# Patient Record
Sex: MALE | Race: WHITE | NOT HISPANIC OR LATINO | Employment: FULL TIME | URBAN - METROPOLITAN AREA
[De-identification: names, ages, dates, MRNs, and addresses within clinical notes are randomized per-mention and may not be internally consistent; named-entity substitution may affect disease eponyms.]

---

## 2017-01-20 ENCOUNTER — TRANSCRIBE ORDERS (OUTPATIENT)
Dept: PHYSICAL THERAPY | Facility: CLINIC | Age: 57
End: 2017-01-20

## 2017-01-20 DIAGNOSIS — S63.502D SPRAIN OF LEFT FOREARM, SUBSEQUENT ENCOUNTER: Primary | ICD-10-CM

## 2017-01-26 ENCOUNTER — TREATMENT (OUTPATIENT)
Dept: PHYSICAL THERAPY | Facility: CLINIC | Age: 57
End: 2017-01-26

## 2017-01-26 DIAGNOSIS — S63.502D SPRAIN OF WRIST, LEFT, SUBSEQUENT ENCOUNTER: Primary | ICD-10-CM

## 2017-01-26 PROCEDURE — 97110 THERAPEUTIC EXERCISES: CPT | Performed by: PHYSICAL THERAPIST

## 2017-01-26 PROCEDURE — 97002 PR PHYS THERAPY RE-EVALUATION: CPT | Performed by: PHYSICAL THERAPIST

## 2017-01-26 NOTE — PROGRESS NOTES
Physical Therapy Initial Evaluation and Plan of Care    TIME IN 8:05 TIME OUT 8:53    Subjective Evaluation    History of Present Illness  Date of onset: 10/6/2016  Mechanism of injury: Basket standing in fell while he was holding on with L hand    Pain  Current pain ratin  At best pain ratin  At worst pain ratin (initial injury)  Quality: sharp and dull ache  Relieving factors: rest  Exacerbated by: gripping.    Hand dominance: right    Diagnostic Tests  MRI studies: abnormal    Treatments  Previous treatment: physical therapy, injection treatment and medication  Current treatment comments: brace.     Patient Goals  Patient goals for therapy: return to work  Patient goal: STGs x x 1 wk  1. Decrease pain and popping with movement  2. Increase  strength by > 5 lbs for improved gripping ADLs  3. Tolerates lifting 50 lbs to waist    LTGs x 4 wks  1. Min to no pain and popping with functional ROM and strength  2.  L within 12 lbs of R  3. Independent with HEP and body mechanics  3. Tolerates lifting up to 75 lbs, climb vertical ladder to allow safe RTW           Objective     Tenderness     Additional Tenderness Details  No TTP    Active Range of Motion     Left Wrist   Wrist flexion: 65 degrees   Wrist extension: 63 degrees   Radial deviation: 30 degrees   Ulnar deviation: 35 degrees     Strength/Myotome Testing     Left Wrist/Hand   Wrist extension: 5  Wrist flexion: 4+  Radial deviation: 4+ (pain)  Ulnar deviation: 4+     (2nd hand position)     Trial 1: 75    Trial 2: 77    Trial 3: 66    Average: 72.67    Right Wrist/Hand      (2nd hand position)     Trial 1: 95    Trial 2: 97    Trial 3: 90    Average: 94    Tests     Additional Tests Details  NT - MRI did not indicate TFCC tear         Assessment & Plan     Assessment  Impairments: impaired physical strength and pain with function  Assessment details: Good improvement since injection with increased strength and decreased pain.  Still  with some weakness and mild pain with activity.  Has been off work for ~ 3 months and needs to focus on improving strength/stability to prepare for safe RTW.  Prognosis: good    Goals  STGs x x 1 wk  1. Decrease pain and popping with movement  2. Increase  strength by > 5 lbs for improved gripping ADLs  3. Tolerates lifting 50 lbs to waist    LTGs x 4 wks  1. Min to no pain and popping with functional ROM and strength  2.  L within 12 lbs of R  3. Independent with HEP and body mechanics  3. Tolerates lifting up to 75 lbs, climb vertical ladder to allow safe RTW    Plan  Therapy options: will be seen for skilled physical therapy services  Planned modality interventions: cryotherapy and thermotherapy (paraffin bath)  Planned therapy interventions: functional ROM exercises, strengthening, stretching, therapeutic activities and body mechanics training  Frequency: 3x week  Duration in weeks: 4  Treatment plan discussed with: patient        Manual Therapy:    0     mins  58769;  Therapeutic Exercise:    26     mins  43185;     Neuromuscular Nneka:    0    mins  09777;    Therapeutic Activity:     0     mins  38795;     Gait Trainin     mins  58111;     Ultrasound:     0     mins  42424;    Work Hardening           0      mins 79025  Iontophoresis               0   mins 79577    Timed Treatment:   26   mins   Total Treatment:     48   mins    PT SIGNATURE: Celsa Guadarrama PT   DATE TREATMENT INITIATED: 2017    Initial Certification  Certification Period: 2017  I certify that the therapy services are furnished while this patient is under my care.  The services outlined above are required by this patient, and will be reviewed every 90 days.     PHYSICIAN: Luis Fernando Mathew MD      DATE:     Please sign and return via fax to [phone].. Thank you, [facility].

## 2017-01-26 NOTE — LETTER
Physical Therapy Initial Evaluation and Plan of Care    TIME IN 8:05 TIME OUT 8:53    Subjective Evaluation    History of Present Illness  Date of onset: 10/6/2016  Mechanism of injury: Basket standing in fell while he was holding on with L hand    Pain  Current pain ratin  At best pain ratin  At worst pain ratin (initial injury)  Quality: sharp and dull ache  Relieving factors: rest  Exacerbated by: gripping.    Hand dominance: right    Diagnostic Tests  MRI studies: abnormal    Treatments  Previous treatment: physical therapy, injection treatment and medication  Current treatment comments: brace.     Patient Goals  Patient goals for therapy: return to work  Patient goal: STGs x x 1 wk  1. Decrease pain and popping with movement  2. Increase  strength by > 5 lbs for improved gripping ADLs  3. Tolerates lifting 50 lbs to waist    LTGs x 4 wks  1. Min to no pain and popping with functional ROM and strength  2.  L within 12 lbs of R  3. Independent with HEP and body mechanics  3. Tolerates lifting up to 75 lbs, climb vertical ladder to allow safe RTW           Objective     Tenderness     Additional Tenderness Details  No TTP    Active Range of Motion     Left Wrist   Wrist flexion: 65 degrees   Wrist extension: 63 degrees   Radial deviation: 30 degrees   Ulnar deviation: 35 degrees     Strength/Myotome Testing     Left Wrist/Hand   Wrist extension: 5  Wrist flexion: 4+  Radial deviation: 4+ (pain)  Ulnar deviation: 4+     (2nd hand position)     Trial 1: 75    Trial 2: 77    Trial 3: 66    Average: 72.67    Right Wrist/Hand      (2nd hand position)     Trial 1: 95    Trial 2: 97    Trial 3: 90    Average: 94    Tests     Additional Tests Details  NT - MRI did not indicate TFCC tear         Assessment & Plan     Assessment  Impairments: impaired physical strength and pain with function  Assessment details: Good improvement since injection with increased strength and decreased pain.  Still  with some weakness and mild pain with activity.  Has been off work for ~ 3 months and needs to focus on improving strength/stability to prepare for safe RTW.  Prognosis: good    Goals  STGs x x 1 wk  1. Decrease pain and popping with movement  2. Increase  strength by > 5 lbs for improved gripping ADLs  3. Tolerates lifting 50 lbs to waist    LTGs x 4 wks  1. Min to no pain and popping with functional ROM and strength  2.  L within 12 lbs of R  3. Independent with HEP and body mechanics  3. Tolerates lifting up to 75 lbs, climb vertical ladder to allow safe RTW    Plan  Therapy options: will be seen for skilled physical therapy services  Planned modality interventions: cryotherapy and thermotherapy (paraffin bath)  Planned therapy interventions: functional ROM exercises, strengthening, stretching, therapeutic activities and body mechanics training  Frequency: 3x week  Duration in weeks: 4  Treatment plan discussed with: patient        Manual Therapy:    0     mins  50054;  Therapeutic Exercise:    26     mins  57649;     Neuromuscular Nneka:    0    mins  05900;    Therapeutic Activity:     0     mins  46782;     Gait Trainin     mins  48263;     Ultrasound:     0     mins  43976;    Work Hardening           0      mins 88137  Iontophoresis               0   mins 89338    Timed Treatment:   26   mins   Total Treatment:     48   mins    PT SIGNATURE: Celsa Guadarrama PT   DATE TREATMENT INITIATED: 2017    Initial Certification  Certification Period: 2017  I certify that the therapy services are furnished while this patient is under my care.  The services outlined above are required by this patient, and will be reviewed every 90 days.     PHYSICIAN: Luis Fernando Mathew MD      DATE:     Please sign and return via fax to [phone].. Thank you, [facility].

## 2017-01-30 ENCOUNTER — TREATMENT (OUTPATIENT)
Dept: PHYSICAL THERAPY | Facility: CLINIC | Age: 57
End: 2017-01-30

## 2017-01-30 DIAGNOSIS — S63.502D SPRAIN OF WRIST, LEFT, SUBSEQUENT ENCOUNTER: Primary | ICD-10-CM

## 2017-01-30 PROCEDURE — 97110 THERAPEUTIC EXERCISES: CPT | Performed by: PHYSICAL THERAPIST

## 2017-01-30 PROCEDURE — 97530 THERAPEUTIC ACTIVITIES: CPT | Performed by: PHYSICAL THERAPIST

## 2017-01-30 NOTE — PROGRESS NOTES
Physical Therapy Daily Progress Note    Time In 8:29  Time Out 9:18    Joni Lee reports: doing fine    Subjective Evaluation    Pain  Current pain ratin           Objective   See Exercise, Manual, and Modality Logs for complete treatment.       Assessment & Plan     Assessment  Assessment details: Responding favorably to Rx with tolerance for increased ex and work sim activities with only mild c/o pain    Plan  Plan details: Progress ex and therapeutic activities as lauren                     Manual Therapy:    0     mins  63793;  Therapeutic Exercise:    35     mins  04117;     Neuromuscular Nneka:    8    mins  08985;    Therapeutic Activity:     0     mins  83842;     Gait Trainin     mins  02748;     Ultrasound:     0     mins  27988;    Work Hardening           0      mins 01088  Iontophoresis               0   mins 64365    Timed Treatment:   43   mins   Total Treatment:     49   mins    Celsa Guadarrama PT  Physical Therapist

## 2017-02-01 ENCOUNTER — TREATMENT (OUTPATIENT)
Dept: PHYSICAL THERAPY | Facility: CLINIC | Age: 57
End: 2017-02-01

## 2017-02-01 DIAGNOSIS — S63.502D SPRAIN OF WRIST, LEFT, SUBSEQUENT ENCOUNTER: Primary | ICD-10-CM

## 2017-02-01 PROCEDURE — 97530 THERAPEUTIC ACTIVITIES: CPT | Performed by: PHYSICAL THERAPIST

## 2017-02-01 PROCEDURE — 97110 THERAPEUTIC EXERCISES: CPT | Performed by: PHYSICAL THERAPIST

## 2017-02-01 NOTE — PROGRESS NOTES
Physical Therapy Daily Progress Note    Time In 8:34  Time Out 9:15    Joni Lee reports: did fine after the lifting last time    Subjective     Objective   See Exercise, Manual, and Modality Logs for complete treatment.       Assessment & Plan     Assessment  Assessment details: Progressing well without increased pain; able to increase resistance and/or reps     Plan  Plan details: Continue to focus on strength, stability and function                     Manual Therapy:    0     mins  78175;  Therapeutic Exercise:    28     mins  62025;     Neuromuscular Nneka:    0    mins  96922;    Therapeutic Activity:     11     mins  05143;     Gait Trainin     mins  08263;     Ultrasound:     0     mins  17741;    Work Hardening           0      mins 65690  Iontophoresis               0   mins 06717    Timed Treatment:   39   mins   Total Treatment:     41   mins    Celsa Guadarrama PT  Physical Therapist

## 2017-02-03 ENCOUNTER — TREATMENT (OUTPATIENT)
Dept: PHYSICAL THERAPY | Facility: CLINIC | Age: 57
End: 2017-02-03

## 2017-02-03 DIAGNOSIS — S63.502D SPRAIN OF WRIST, LEFT, SUBSEQUENT ENCOUNTER: Primary | ICD-10-CM

## 2017-02-03 PROCEDURE — 97110 THERAPEUTIC EXERCISES: CPT | Performed by: PHYSICAL THERAPIST

## 2017-02-03 PROCEDURE — 97530 THERAPEUTIC ACTIVITIES: CPT | Performed by: PHYSICAL THERAPIST

## 2017-02-03 NOTE — PROGRESS NOTES
Physical Therapy  Progress Note    Time In 7:01  Time Out 7:48      2/3/2017  Luis Fernando Mathew MD    Re: Joni Lee  ________________________________________________________________    Mr. Joni Lee, has attended 4/4 PT sessions since your referral.  Treatment has consisted of: progressive there-ex and work simulation activities.     S: Mr. Joni Lee states: only slight pain with use of L wrist          Subjective Evaluation    Pain  Current pain ratin           Objective     Tenderness     Left Wrist/Hand   Tenderness in the triangular fibrocartilage complex.     Additional Tenderness Details  Ulnar-triquetral    Active Range of Motion     Left Wrist   Wrist flexion: 65 degrees   Wrist extension: 67 degrees   Radial deviation: 23 degrees   Ulnar deviation: 30 degrees     Strength/Myotome Testing     Left Wrist/Hand   Normal wrist strength     (2nd hand position)     Trial 1: 86    Trial 2: 79    Trial 3: 78    Average: 81    Right Wrist/Hand      (2nd hand position)     Trial 1: 105    Trial 2: 102    Trial 3: 101    Average: 102.67    Tests     Additional Tests Details  Increased joint play L vs R wrist    Functional Assessment     Comments  Tolerates lifting up to 70 lbs knees to waist without c/o pain.  Tolerates vertical ladder climbing without c/o pain.     See Exercise, Manual, and Modality Logs for complete treatment.       Assessment & Plan     Assessment  Assessment details: Excellent overall progress with improved mobility and strength and tolerance for functional activities.  May be ready for D/C to HEP and RTW.    Plan  Plan details: Please advise after your exam.                   Manual Therapy:    0     mins  55572;  Therapeutic Exercise:    28     mins  30840;     Neuromuscular Nneka:    0    mins  46346;    Therapeutic Activity:     14     mins  48524;     Gait Trainin     mins  82190;     Ultrasound:     0     mins  94112;    Work Hardening           0       mins 07272  Iontophoresis               0   mins 68158    Timed Treatment:   42   mins   Total Treatment:     47   mins    Celsa Guadarrama PT  Physical Therapist

## 2025-02-25 RX ORDER — SILDENAFIL CITRATE 20 MG/1
20-100 TABLET ORAL AS NEEDED
COMMUNITY
Start: 2024-05-05 | End: 2025-05-05

## 2025-02-25 RX ORDER — FAMOTIDINE 20 MG/1
20 TABLET, FILM COATED ORAL NIGHTLY
COMMUNITY

## 2025-02-25 RX ORDER — ACETAMINOPHEN 500 MG
1000 TABLET ORAL EVERY 6 HOURS PRN
COMMUNITY

## 2025-02-25 RX ORDER — IBUPROFEN 200 MG
400 TABLET ORAL EVERY 6 HOURS PRN
COMMUNITY

## 2025-02-25 RX ORDER — LISINOPRIL 10 MG/1
10 TABLET ORAL DAILY
COMMUNITY

## 2025-02-25 RX ORDER — DICLOFENAC SODIUM 75 MG/1
1 TABLET, DELAYED RELEASE ORAL 2 TIMES DAILY
COMMUNITY
Start: 2025-01-28

## 2025-02-25 RX ORDER — SIMVASTATIN 40 MG
40 TABLET ORAL DAILY
COMMUNITY

## 2025-02-25 RX ORDER — VALACYCLOVIR HYDROCHLORIDE 500 MG/1
500 TABLET, FILM COATED ORAL AS NEEDED
COMMUNITY

## 2025-02-25 NOTE — DISCHARGE INSTRUCTIONS
[facility]     Home Care after: Retrograde Urethrogram / Cystoscopy (Studying the urethra for narrowing)    Follow the guidelines below. Call your doctor if you notice any unusual symptoms. Remember: You may be under the influence of medication. Do not drive, drink alcoholic beverages, sign legal documents or make major decisions during the next 24 hours.    Wound Care  You have No incisions  You may have some pink tinge to the urine and this may fluctuate and then go away.    Activity  Plan at least the next day off work, more if necessary, especially if your job requires heavy lifting or a lot of physical activities.  If you wish to return to work sooner, that is okay, but light-duty is recommended.  Sexual activity may resume  No activity restrictions  No driving while taking narcotic pain medication  Avoid straining with bowel movements. Narcotics can cause constipation so you can take over-the-counter stool softeners (Senokot-S, Miralax, Colace) per the instructions on the box; hold these pills if you are experiencing diarrhea.       Return to Work/School  You may return to work/school tomorrow  If you need a note, please obtain from Dr. Meraz's office during your follow-up visit    Bathing  No restrictions    Diet  Gradually resume regular diet, as tolerated.   Drinking plenty of water is very important.    Driving (if applicable)  No driving for 24 hours after surgery due to the anesthetic.  No driving anytime you are on pain medication.    Educational  The medication used to put you to sleep will be acting in your body for the next 24 hours, so you might feel a little sleepy. This feeling will slowly wear off. For the first 24 hours, you should NOT:   Drive a car, operate machinery, or power tools.  Drink any alcoholic drinks (even beer).   Make any important decisions, sign any important or legal papers.  For your safety, we strongly suggest that a responsible adult stay with you for the rest of the day  and during the night after you leave the hospital.  Medication  You may take your usual medications unless told otherwise by your doctor.  You can any anticoagulation (Ibuprofen, Advil, Aspirin, Eliquis, Xarelto, Coumadin, etc) you are currently prescribed.  Narcotic pain medications can cause constipation. You may take an over-the-counter stool softener or laxative if needed.   A bowel movement every day is preferred, every other day is reasonable.      Call your Doctor if:  Call to notify your surgeon if you develop:  Fever greater than 101F  Unmanageable pain despite pain medication  Pain medication not effective or makes you ill  Blood staining continues to worsen for more than 24 hours  Difficulty breathing or unusual shortness of breath, excessive bleeding, drainage at the operative site, fevers, chills, increased pain that is not relieved by pain medications, persistent nausea or vomiting    HOW TO REACH YOUR DOCTOR  Monday - Friday from 8:00 - 4:30, call the Urology Office, [phone].  After hours, weekend and holidays call the [phone] or go to Emergency Room    Follow up care:   The Urology clinic will call to schedule your post-op appointment and/or surgery scheduling  If you do not receive a call within 1 week for scheduling, please call [phone] to make an appointment.

## 2025-02-25 NOTE — PAT
PAT call complete. Education provided to the patient on the following:    - Nothing to eat after midnight the night before your procedure, water and black coffee okay up to 2 hours before arrival time.  - You will need to have someone drive you home after your procedure and remain with you for 24 hours after. The  will need to remain on site during your visit.  - Please remove all jewelry, including body piercing's, and leave any valuables at home. Only bring your drivers license and insurance card on day of procedure.  - Wash with antibacterial soap (such as Dial) the night before and morning of procedure.  - Be prepared to provide your last dose of all home medications.  - Coffee and vending available on the 1st and 5th floors; no cafeteria on site.  - You will need to arrive at 0600 on 3/4/25 at [facility] located at [address]. You'll get registered on the first floor then bring your papers up to the 5th floor and a  nurse will come out to get you.  -Please be aware that arrival times may be subject to change up until the day of surgery. You'll get a reminder call the day prior to your procedure.   - Feel free to contact us at: [phone] with any additional questions/concerns.    **Instructed pt to stop diclofenac and ibuprofen stating today and to take famotidine the morning of surgery with a sip of water. Pt verbalized understanding.

## 2025-03-04 ENCOUNTER — ANESTHESIA (OUTPATIENT)
Age: 65
End: 2025-03-04
Payer: COMMERCIAL

## 2025-03-04 ENCOUNTER — APPOINTMENT (OUTPATIENT)
Age: 65
End: 2025-03-04
Payer: COMMERCIAL

## 2025-03-04 ENCOUNTER — ANESTHESIA EVENT (OUTPATIENT)
Age: 65
End: 2025-03-04
Payer: COMMERCIAL

## 2025-03-04 ENCOUNTER — HOSPITAL ENCOUNTER (OUTPATIENT)
Age: 65
Setting detail: HOSPITAL OUTPATIENT SURGERY
Discharge: HOME | End: 2025-03-04
Attending: UROLOGY | Admitting: UROLOGY
Payer: COMMERCIAL

## 2025-03-04 VITALS
RESPIRATION RATE: 18 BRPM | HEART RATE: 63 BPM | SYSTOLIC BLOOD PRESSURE: 141 MMHG | HEIGHT: 71 IN | WEIGHT: 229.8 LBS | TEMPERATURE: 97.7 F | OXYGEN SATURATION: 95 % | DIASTOLIC BLOOD PRESSURE: 90 MMHG | BODY MASS INDEX: 32.17 KG/M2

## 2025-03-04 PROCEDURE — 25010000002 FENTANYL CITRATE (PF) 50 MCG/ML SOLUTION: Performed by: NURSE ANESTHETIST, CERTIFIED REGISTERED

## 2025-03-04 PROCEDURE — 25010000002 LIDOCAINE 2% SOLUTION: Performed by: NURSE ANESTHETIST, CERTIFIED REGISTERED

## 2025-03-04 PROCEDURE — 25810000003 SODIUM CHLORIDE PER 500 ML: Performed by: UROLOGY

## 2025-03-04 PROCEDURE — 25810000003 LACTATED RINGERS PER 1000 ML: Performed by: ANESTHESIOLOGY

## 2025-03-04 PROCEDURE — 25010000002 PROPOFOL 10 MG/ML EMULSION: Performed by: NURSE ANESTHETIST, CERTIFIED REGISTERED

## 2025-03-04 PROCEDURE — 25010000002 CEFAZOLIN PER 500 MG: Performed by: UROLOGY

## 2025-03-04 PROCEDURE — 25010000002 ONDANSETRON PER 1 MG: Performed by: NURSE ANESTHETIST, CERTIFIED REGISTERED

## 2025-03-04 PROCEDURE — 25510000002 IOTHALAMATE 60 % SOLUTION: Performed by: UROLOGY

## 2025-03-04 PROCEDURE — 25010000002 DEXAMETHASONE SODIUM PHOSPHATE 20 MG/5ML SOLUTION: Performed by: NURSE ANESTHETIST, CERTIFIED REGISTERED

## 2025-03-04 PROCEDURE — 51610 INJECTION FOR BLADDER X-RAY: CPT | Performed by: UROLOGY

## 2025-03-04 RX ORDER — ONDANSETRON 2 MG/ML
INJECTION INTRAMUSCULAR; INTRAVENOUS AS NEEDED
Status: DISCONTINUED | OUTPATIENT
Start: 2025-03-04 | End: 2025-03-04 | Stop reason: SURG

## 2025-03-04 RX ORDER — LABETALOL HYDROCHLORIDE 5 MG/ML
5 INJECTION, SOLUTION INTRAVENOUS
Status: DISCONTINUED | OUTPATIENT
Start: 2025-03-04 | End: 2025-03-04 | Stop reason: HOSPADM

## 2025-03-04 RX ORDER — MIDAZOLAM HYDROCHLORIDE 1 MG/ML
1 INJECTION, SOLUTION INTRAMUSCULAR; INTRAVENOUS
Status: DISCONTINUED | OUTPATIENT
Start: 2025-03-04 | End: 2025-03-04 | Stop reason: HOSPADM

## 2025-03-04 RX ORDER — FAMOTIDINE 10 MG/ML
20 INJECTION, SOLUTION INTRAVENOUS ONCE
Status: COMPLETED | OUTPATIENT
Start: 2025-03-04 | End: 2025-03-04

## 2025-03-04 RX ORDER — PROMETHAZINE HYDROCHLORIDE 12.5 MG/1
25 TABLET ORAL ONCE AS NEEDED
Status: DISCONTINUED | OUTPATIENT
Start: 2025-03-04 | End: 2025-03-04 | Stop reason: HOSPADM

## 2025-03-04 RX ORDER — DEXAMETHASONE SODIUM PHOSPHATE 4 MG/ML
INJECTION, SOLUTION INTRA-ARTICULAR; INTRALESIONAL; INTRAMUSCULAR; INTRAVENOUS; SOFT TISSUE AS NEEDED
Status: DISCONTINUED | OUTPATIENT
Start: 2025-03-04 | End: 2025-03-04 | Stop reason: SURG

## 2025-03-04 RX ORDER — SODIUM CHLORIDE 0.9 % (FLUSH) 0.9 %
3-10 SYRINGE (ML) INJECTION AS NEEDED
Status: DISCONTINUED | OUTPATIENT
Start: 2025-03-04 | End: 2025-03-04 | Stop reason: HOSPADM

## 2025-03-04 RX ORDER — SODIUM CHLORIDE 0.9 % (FLUSH) 0.9 %
3 SYRINGE (ML) INJECTION EVERY 12 HOURS SCHEDULED
Status: DISCONTINUED | OUTPATIENT
Start: 2025-03-04 | End: 2025-03-04 | Stop reason: HOSPADM

## 2025-03-04 RX ORDER — DROPERIDOL 2.5 MG/ML
0.62 INJECTION, SOLUTION INTRAMUSCULAR; INTRAVENOUS
Status: DISCONTINUED | OUTPATIENT
Start: 2025-03-04 | End: 2025-03-04 | Stop reason: HOSPADM

## 2025-03-04 RX ORDER — FENTANYL CITRATE 50 UG/ML
50 INJECTION, SOLUTION INTRAMUSCULAR; INTRAVENOUS ONCE AS NEEDED
Status: DISCONTINUED | OUTPATIENT
Start: 2025-03-04 | End: 2025-03-04 | Stop reason: HOSPADM

## 2025-03-04 RX ORDER — OXYCODONE AND ACETAMINOPHEN 7.5; 325 MG/1; MG/1
1 TABLET ORAL EVERY 4 HOURS PRN
Status: DISCONTINUED | OUTPATIENT
Start: 2025-03-04 | End: 2025-03-04 | Stop reason: HOSPADM

## 2025-03-04 RX ORDER — HYDRALAZINE HYDROCHLORIDE 20 MG/ML
5 INJECTION INTRAMUSCULAR; INTRAVENOUS
Status: DISCONTINUED | OUTPATIENT
Start: 2025-03-04 | End: 2025-03-04 | Stop reason: HOSPADM

## 2025-03-04 RX ORDER — ONDANSETRON 2 MG/ML
4 INJECTION INTRAMUSCULAR; INTRAVENOUS ONCE AS NEEDED
Status: DISCONTINUED | OUTPATIENT
Start: 2025-03-04 | End: 2025-03-04 | Stop reason: HOSPADM

## 2025-03-04 RX ORDER — LIDOCAINE HYDROCHLORIDE 20 MG/ML
INJECTION, SOLUTION INFILTRATION; PERINEURAL AS NEEDED
Status: DISCONTINUED | OUTPATIENT
Start: 2025-03-04 | End: 2025-03-04 | Stop reason: SURG

## 2025-03-04 RX ORDER — FLUMAZENIL 0.1 MG/ML
0.2 INJECTION INTRAVENOUS AS NEEDED
Status: DISCONTINUED | OUTPATIENT
Start: 2025-03-04 | End: 2025-03-04 | Stop reason: HOSPADM

## 2025-03-04 RX ORDER — ATROPINE SULFATE 0.4 MG/ML
0.4 INJECTION, SOLUTION INTRAMUSCULAR; INTRAVENOUS; SUBCUTANEOUS ONCE AS NEEDED
Status: DISCONTINUED | OUTPATIENT
Start: 2025-03-04 | End: 2025-03-04 | Stop reason: HOSPADM

## 2025-03-04 RX ORDER — FENTANYL CITRATE 50 UG/ML
50 INJECTION, SOLUTION INTRAMUSCULAR; INTRAVENOUS
Status: DISCONTINUED | OUTPATIENT
Start: 2025-03-04 | End: 2025-03-04 | Stop reason: HOSPADM

## 2025-03-04 RX ORDER — HYDROMORPHONE HYDROCHLORIDE 1 MG/ML
0.5 INJECTION, SOLUTION INTRAMUSCULAR; INTRAVENOUS; SUBCUTANEOUS
Status: DISCONTINUED | OUTPATIENT
Start: 2025-03-04 | End: 2025-03-04 | Stop reason: HOSPADM

## 2025-03-04 RX ORDER — NALOXONE HCL 0.4 MG/ML
0.2 VIAL (ML) INJECTION AS NEEDED
Status: DISCONTINUED | OUTPATIENT
Start: 2025-03-04 | End: 2025-03-04 | Stop reason: HOSPADM

## 2025-03-04 RX ORDER — SODIUM CHLORIDE 9 MG/ML
INJECTION, SOLUTION INTRAVENOUS AS NEEDED
Status: DISCONTINUED | OUTPATIENT
Start: 2025-03-04 | End: 2025-03-04 | Stop reason: HOSPADM

## 2025-03-04 RX ORDER — PROMETHAZINE HYDROCHLORIDE 25 MG/1
25 SUPPOSITORY RECTAL ONCE AS NEEDED
Status: DISCONTINUED | OUTPATIENT
Start: 2025-03-04 | End: 2025-03-04 | Stop reason: HOSPADM

## 2025-03-04 RX ORDER — DIPHENHYDRAMINE HYDROCHLORIDE 50 MG/ML
12.5 INJECTION INTRAMUSCULAR; INTRAVENOUS
Status: DISCONTINUED | OUTPATIENT
Start: 2025-03-04 | End: 2025-03-04 | Stop reason: HOSPADM

## 2025-03-04 RX ORDER — FENTANYL CITRATE 50 UG/ML
INJECTION, SOLUTION INTRAMUSCULAR; INTRAVENOUS AS NEEDED
Status: DISCONTINUED | OUTPATIENT
Start: 2025-03-04 | End: 2025-03-04 | Stop reason: SURG

## 2025-03-04 RX ORDER — HYDROCODONE BITARTRATE AND ACETAMINOPHEN 5; 325 MG/1; MG/1
1 TABLET ORAL ONCE AS NEEDED
Status: DISCONTINUED | OUTPATIENT
Start: 2025-03-04 | End: 2025-03-04 | Stop reason: HOSPADM

## 2025-03-04 RX ORDER — SODIUM CHLORIDE, SODIUM LACTATE, POTASSIUM CHLORIDE, CALCIUM CHLORIDE 600; 310; 30; 20 MG/100ML; MG/100ML; MG/100ML; MG/100ML
9 INJECTION, SOLUTION INTRAVENOUS CONTINUOUS
Status: DISCONTINUED | OUTPATIENT
Start: 2025-03-04 | End: 2025-03-04 | Stop reason: HOSPADM

## 2025-03-04 RX ORDER — PROPOFOL 10 MG/ML
VIAL (ML) INTRAVENOUS AS NEEDED
Status: DISCONTINUED | OUTPATIENT
Start: 2025-03-04 | End: 2025-03-04 | Stop reason: SURG

## 2025-03-04 RX ADMIN — SODIUM CHLORIDE, POTASSIUM CHLORIDE, SODIUM LACTATE AND CALCIUM CHLORIDE 9 ML/HR: 600; 310; 30; 20 INJECTION, SOLUTION INTRAVENOUS at 06:38

## 2025-03-04 RX ADMIN — DEXAMETHASONE SODIUM PHOSPHATE 8 MG: 4 INJECTION, SOLUTION INTRAMUSCULAR; INTRAVENOUS at 07:31

## 2025-03-04 RX ADMIN — ONDANSETRON 4 MG: 2 INJECTION, SOLUTION INTRAMUSCULAR; INTRAVENOUS at 07:37

## 2025-03-04 RX ADMIN — SODIUM CHLORIDE 2000 MG: 900 INJECTION INTRAVENOUS at 07:08

## 2025-03-04 RX ADMIN — FAMOTIDINE 20 MG: 10 INJECTION, SOLUTION INTRAVENOUS at 06:39

## 2025-03-04 RX ADMIN — LIDOCAINE HYDROCHLORIDE 80 MG: 20 INJECTION, SOLUTION INFILTRATION; PERINEURAL at 07:18

## 2025-03-04 RX ADMIN — PROPOFOL 200 MG: 10 INJECTION, EMULSION INTRAVENOUS at 07:18

## 2025-03-04 RX ADMIN — FENTANYL CITRATE 50 MCG: 50 INJECTION, SOLUTION INTRAMUSCULAR; INTRAVENOUS at 07:18

## 2025-03-04 NOTE — ANESTHESIA PROCEDURE NOTES
Airway  Urgency: elective    Date/Time: 3/4/2025 7:20 AM  Airway not difficult    General Information and Staff    Patient location during procedure: OR  CRNA/CAA: Alejandra Kulkarni CRNA    Indications and Patient Condition  Indications for airway management: airway protection    Preoxygenated: yes  MILS not maintained throughout  Mask difficulty assessment: 1 - vent by mask    Final Airway Details  Final airway type: supraglottic airway      Successful airway: classic and LMA  Size 5     Number of attempts at approach: 1  Assessment: lips, teeth, and gum same as pre-op    Additional Comments  Inflated to seal.

## 2025-03-04 NOTE — OP NOTE
"Operative Note  3/4/2025    Joni Lee  64 y.o.  1960  male  [id number]    Surgeon(s) and Role:  Reyes Meraz MD - Primary     Pre-operative Diagnosis: Bulbar Urethral stricture    Post-operative Diagnosis: Same    Complications: None    Procedures:  Injection for Retrograde Urethrocystography (85184)  Urethrocystography, Retrograde (95948-67)  Cystoscopy, flexible (38074)    Indications: Formal evaluation of urethral stricture. History of prior urethroplasty. No with recurrent urethral stricture. Very nice man with a tough problem and hopefully we can fix it with future redo urethroplasty.    We discussed the benefits/risks/expectations and the patient desires procedure. Risks include bleeding, infection, urinary tract infection/sepsis, damage to adjacent tissues including the genitalia, discomfort/pain, urinary tract infection, and radiation exposure, stroke, heart attack, and death    Consent: Prior to the procedure, a \"time-out\" was performed. The patient was correctly identified.  he risks and benefits of the procedure were discussed, including discomfort/pain, urinary tract infection, and radiation exposure. The patient verbalized understanding and agreed to proceed and signed consent was obtained.      Description of Procedure: General Anesthesia was induced. The patient was positioned on the fluoroscopy table in the oblique position. The penis was prepped with betadine solution. A sterile 4x4 gauze was wrapped around the glans and pressure was applied while cystografin 30% contrast was instilled retrograde through the the urethra using a 60mL syringe with adaptor. Fluoroscopy was used and interpreted.      Retrograde Urethrogram findings:   - Widely patent penile urethra  -  Proximal bulbar urethral stricture, approx 2.5-3cm   - Membranous Urethra - unable to visualize  - Prostatic Urethra - unable to visualize     Next, the lubricated Olympus flexible cystoscope was introduced gently " into the urethra. The penile urethra appears normal. The scope was advanced to the distal aspect of the bulbar urethral stricture.  The urothelium at the stricture was pale. The scope was not advanced through the stricture.     There was no bleeding noted during this procedure. The procedure was tolerated well.      After the procedure the patient was moved off the imaging table and asked to dress himself again.     Images Saved.      Estimated Blood Loss: None    Assessment:     Proximal bulbar urethral stricture, recurrent    We discussed all options and contingency plans in detail.  He is already scheduled for urethroplasty, [place] of Buccal Mucosa graft in April 2025.         Reyes Meraz MD  [facility]  General & Reconstructive Urology  Office: [phone]  Fax: [phone]

## 2025-03-04 NOTE — H&P
[facility] History & Physical      Patient Identification:  NAME:  Joni Lee  Age:  64 y.o.   Sex:  male   :  1960   MRN:  [id number]       Chief complaint: Planned Procedure    History of present illness:  Joni Lee is a 64 y.o. man here for evaluation of urethral patency. No changes since last seen in clinic. History of Urethroplasty, now with recurrent bulbar urethral stricture.      Past medical history:  Past Medical History:   Diagnosis Date    Arthritis     BILAT. KNEE PAIN    Benign prostatic hyperplasia with nocturia     Bulbous urethral stricture 2023    Erectile dysfunction 2016    Essential hypertension 2016    GERD (gastroesophageal reflux disease)     Glucose intolerance     Hyperlipidemia     Pure hypercholesterolemia 2016    Septic arthritis 2021    STD (male) 2010    PRIMARY SYPHILLIS    Umbilical hernia 2016    no surgery       Past surgical history:  Past Surgical History:   Procedure Laterality Date    COLONOSCOPY      CYSTOSCOPY URETHROGRAM  2023    INGUINAL HERNIA REPAIR Right 2007    KNEE ARTHROSCOPY Left     URETHROPLASTY  2023       Allergies:  Penicillins    Home medications:  Medications Prior to Admission   Medication Sig Dispense Refill Last Dose/Taking    acetaminophen (TYLENOL) 500 MG tablet Take 2 tablets by mouth Every 6 (Six) Hours As Needed for Mild Pain.   3/3/2025    diclofenac (VOLTAREN) 75 MG EC tablet Take 1 tablet by mouth 2 (Two) Times a Day.   2025    famotidine (PEPCID) 20 MG tablet Take 1 tablet by mouth Every Night.   3/3/2025    lisinopril (PRINIVIL,ZESTRIL) 10 MG tablet Take 1 tablet by mouth Daily.   3/3/2025    sildenafil (REVATIO) 20 MG tablet Take 1-5 tablets by mouth As Needed.   2025    simvastatin (ZOCOR) 40 MG tablet Take 1 tablet by mouth Daily.   3/3/2025    ibuprofen (ADVIL,MOTRIN) 200 MG tablet Take 2 tablets by mouth Every 6 (Six) Hours As Needed for Mild Pain.   2025    valACYclovir  (VALTREX) 500 MG tablet Take 1 tablet by mouth As Needed.   More than a month        Hospital medications:  ceFAZolin, 2,000 mg, Intravenous, Once  sodium chloride, 3 mL, Intravenous, Q12H      lactated ringers, 9 mL/hr, Last Rate: 9 mL/hr (25 0649)        atropine    diphenhydrAMINE    droperidol **OR** droperidol    fentanyl    fentanyl    flumazenil    hydrALAZINE    HYDROcodone-acetaminophen    HYDROmorphone    labetalol    midazolam    naloxone    ondansetron    oxyCODONE-acetaminophen    promethazine **OR** promethazine    sodium chloride    Family history:  Family History   Problem Relation Age of Onset    Malig Hyperthermia Neg Hx        Social history:  Social History     Tobacco Use    Smoking status: Never    Smokeless tobacco: Never   Vaping Use    Vaping status: Never Used   Substance Use Topics    Alcohol use: Yes     Comment: social    Drug use: Not Currently     Types: Marijuana     Comment: teens-20s       REVIEW OF SYSTEMS:  Constitutional - Negative for fevers/chills  Eyes/Ears/Nose/Mouth/Throat - Negative for changes in vision  Cardiovascular - Negative for chest pain, dysrhythmia  Respiratory - Negative for dyspnea  Gastrointestinal - Negative for nausea or vomiting  Genitourinary - Negative for dysuria  Hematologic/Lymphatic - Negative for bruising  Skin - Negative for erythema  Endocrine - See PMH    Objective:  TMax 24 hours:   Temp (24hrs), Av.8 °F (36.6 °C), Min:97.8 °F (36.6 °C), Max:97.8 °F (36.6 °C)      Vitals Ranges:   Temp:  [97.8 °F (36.6 °C)] 97.8 °F (36.6 °C)  Heart Rate:  [79] 79  Resp:  [16] 16  BP: (147)/(66) 147/66    Intake/Output Last 3 shifts:  No intake/output data recorded.     Physical Exam:    General Appearance:    Alert, cooperative, NAD   HEENT:    No trauma, pupils reactive, hearing intact   Back:     No CVA tenderness   Lungs:     Respirations unlabored, no wheezing    Heart:    RRR, intact peripheral pulses   Abdomen:     Soft, NDNT, no masses, no  guarding   :    Testes descended bilaterally, no nodules.  Penis normal.      No scrotal or penile rashes noted   Extremities:   No edema, no deformity   Lymphatic:   No neck or groin LAD   Skin:   No bleeding, bruising or rashes   Neuro/Psych:   Orientation intact, mood/affect pleasant, no focal findings       Results review:   I reviewed the patient's new clinical results.    Data review:  Lab Results (last 24 hours)       ** No results found for the last 24 hours. **             Imaging:  Imaging Results (Last 24 Hours)       ** No results found for the last 24 hours. **               Assessment:       * No active hospital problems. *      Urethral stricture    We discussed the benefits/risks/expectations and the patient desires surgical intervention. Risks include bleeding, infection, urinary tract infection/sepsis, Urinary retention, damage to adjacent tissues including the genitalia, chronic pain, numbness, incontinence, need for additional procedures, stroke, heart attack, and death.      Plan:     Proceed with Retrograde Urethrogram, Cystoscopy        Reyes Meraz MD  [facility]  General & Reconstructive Urology  Office: [phone]  Fax: [phone]

## 2025-03-04 NOTE — ANESTHESIA POSTPROCEDURE EVALUATION
"Patient: Joni Lee    Procedure Summary       Date: 03/04/25 Room / Location: [facility] OR 01 / [place]  MAIN OR    Anesthesia Start: 0714 Anesthesia Stop: 0745    Procedure: CYSTOSCOPY RETROGRADE URETHROGRAM (Urethra) Diagnosis:     Surgeons: Reyes Meraz MD Provider: Jesus Velez MD    Anesthesia Type: general ASA Status: 3            Anesthesia Type: general    Vitals  Vitals Value Taken Time   /80 03/04/25 0815   Temp 36.7 °C (98.1 °F) 03/04/25 0745   Pulse 72 03/04/25 0817   Resp 16 03/04/25 0815   SpO2 96 % 03/04/25 0817   Vitals shown include unfiled device data.        Post Anesthesia Care and Evaluation    Patient location during evaluation: bedside  Patient participation: complete - patient participated  Level of consciousness: awake and alert  Pain management: adequate    Airway patency: patent  Anesthetic complications: No anesthetic complications    Cardiovascular status: acceptable  Respiratory status: acceptable  Hydration status: acceptable    Comments: /90 (BP Location: Left arm, Patient Position: Sitting)   Pulse 63   Temp 36.5 °C (97.7 °F) (Temporal)   Resp 18   Ht 180.3 cm (71\")   Wt 104 kg (229 lb 12.8 oz)   SpO2 95%   BMI 32.05 kg/m²     "

## 2025-03-04 NOTE — ANESTHESIA PREPROCEDURE EVALUATION
Anesthesia Evaluation     NPO Solid Status: > 8 hours             Airway   Mallampati: III  TM distance: >3 FB  Neck ROM: full  No difficulty expected and Possible difficult intubation  Dental - normal exam         Pulmonary    (-) wheezes  Cardiovascular     Rhythm: regular    (+) hypertension, hyperlipidemia      Neuro/Psych  GI/Hepatic/Renal/Endo    (+) GERD    ROS Comment: GFR 98    Musculoskeletal     Abdominal    Substance History      OB/GYN          Other                      Anesthesia Plan    ASA 3     general     (  D/W R&B of GA including but not limited to: heart, lung, liver, kidney, neurologic problems, positioning injuries, dental damage, corneal abrasion and TMJ.  .)  intravenous induction     Anesthetic plan, risks, benefits, and alternatives have been provided, discussed and informed consent has been obtained with: patient.    CODE STATUS:

## (undated) DEVICE — ADAPTER,CATHETER/SYRINGE/LUER,STERILE: Brand: MEDLINE

## (undated) DEVICE — GLV SURG BIOGEL LTX PF 7

## (undated) DEVICE — GLV SURG SENSICARE POLYISPRN W/ALOE PF LF 6.5 GRN STRL

## (undated) DEVICE — GLV SURG BIOGEL LTX PF 7 1/2

## (undated) DEVICE — SYRINGE, LUER LOCK, 60ML: Brand: MEDLINE

## (undated) DEVICE — GOWN,SIRUS,NONRNF,SETINSLV,XL,20/CS: Brand: MEDLINE

## (undated) DEVICE — BRECKENRIDGE CYSTO: Brand: MEDLINE INDUSTRIES, INC.

## (undated) DEVICE — GLOVE,SURG,SENSICARE,ALOE,LF,PF,6: Brand: MEDLINE

## (undated) DEVICE — GLV SURG SENSICARE PI PF LF 7 GRN STRL

## (undated) DEVICE — SPNG GZ WOVN 4X4IN 12PLY 10/BX STRL